# Patient Record
Sex: FEMALE | Race: WHITE | NOT HISPANIC OR LATINO | Employment: FULL TIME | URBAN - METROPOLITAN AREA
[De-identification: names, ages, dates, MRNs, and addresses within clinical notes are randomized per-mention and may not be internally consistent; named-entity substitution may affect disease eponyms.]

---

## 2019-07-18 ENCOUNTER — OCCUPATIONAL HEALTH (OUTPATIENT)
Dept: URGENT CARE | Facility: CLINIC | Age: 24
End: 2019-07-18

## 2019-07-18 DIAGNOSIS — Z02.1 DRUG SCREENING, PRE-EMPLOYMENT: Primary | ICD-10-CM

## 2019-07-18 LAB
CTP QC/QA: YES
POC 5 PANEL DRUG SCREEN: NEGATIVE

## 2019-07-18 PROCEDURE — 80305 POCT RAPID DRUG SCREEN 5 PANEL: ICD-10-PCS | Mod: QW,S$GLB,, | Performed by: FAMILY MEDICINE

## 2019-07-18 PROCEDURE — 80305 DRUG TEST PRSMV DIR OPT OBS: CPT | Mod: QW,S$GLB,, | Performed by: FAMILY MEDICINE

## 2019-12-29 ENCOUNTER — HOSPITAL ENCOUNTER (EMERGENCY)
Facility: HOSPITAL | Age: 24
Discharge: HOME | End: 2019-12-29
Attending: INTERNAL MEDICINE

## 2019-12-29 VITALS
HEART RATE: 88 BPM | WEIGHT: 195 LBS | RESPIRATION RATE: 20 BRPM | BODY MASS INDEX: 35.88 KG/M2 | OXYGEN SATURATION: 98 % | DIASTOLIC BLOOD PRESSURE: 62 MMHG | SYSTOLIC BLOOD PRESSURE: 131 MMHG | TEMPERATURE: 99 F | HEIGHT: 62 IN

## 2019-12-29 DIAGNOSIS — R09.81 NASAL CONGESTION: ICD-10-CM

## 2019-12-29 DIAGNOSIS — H65.93 BILATERAL OTITIS MEDIA WITH EFFUSION: Primary | ICD-10-CM

## 2019-12-29 LAB
B-HCG UR QL: NEGATIVE
CTP QC/QA: YES

## 2019-12-29 PROCEDURE — 99284 EMERGENCY DEPT VISIT MOD MDM: CPT | Mod: ER

## 2019-12-29 PROCEDURE — 81025 URINE PREGNANCY TEST: CPT | Mod: ER | Performed by: INTERNAL MEDICINE

## 2019-12-29 PROCEDURE — 25000003 PHARM REV CODE 250: Mod: ER | Performed by: PHYSICIAN ASSISTANT

## 2019-12-29 RX ORDER — LORATADINE 10 MG/1
10 TABLET ORAL DAILY
Qty: 7 TABLET | Refills: 0 | Status: SHIPPED | OUTPATIENT
Start: 2019-12-29 | End: 2020-01-05

## 2019-12-29 RX ORDER — IBUPROFEN 600 MG/1
600 TABLET ORAL
Status: COMPLETED | OUTPATIENT
Start: 2019-12-29 | End: 2019-12-29

## 2019-12-29 RX ORDER — OXYMETAZOLINE HCL 0.05 %
1 SPRAY, NON-AEROSOL (ML) NASAL 2 TIMES DAILY
Qty: 15 ML | Refills: 0 | Status: SHIPPED | OUTPATIENT
Start: 2019-12-29 | End: 2020-01-01

## 2019-12-29 RX ORDER — ACETAMINOPHEN 500 MG
1000 TABLET ORAL
Status: COMPLETED | OUTPATIENT
Start: 2019-12-29 | End: 2019-12-29

## 2019-12-29 RX ADMIN — ACETAMINOPHEN 1000 MG: 500 TABLET, FILM COATED ORAL at 08:12

## 2019-12-29 RX ADMIN — IBUPROFEN 600 MG: 600 TABLET ORAL at 08:12

## 2019-12-30 NOTE — ED PROVIDER NOTES
Encounter Date: 12/29/2019       History     Chief Complaint   Patient presents with    Otalgia     PT C/O BILATERAL EAR PAIN SINCE LAST NIGHT WITH PAIN INCREASING FOR 2 HOURS, ALSO REPORTS SINUS ISSUES FOR A FEW DAYS     24-year-old female with no pertinent past medical history presents to emergency department for 2 day history of atraumatic bilateral otalgia associated with nasal congestion and mild cough.  Notes multiple people around her with similar symptoms.  No history of similar symptoms.  Motrin taken earlier today with some improvement of symptoms. Denies fever, sore throat, emesis, and headache. Tolerating p.o..        Review of patient's allergies indicates:   Allergen Reactions    Penicillins Other (See Comments)     Unknown reaction     History reviewed. No pertinent past medical history.  Past Surgical History:   Procedure Laterality Date    MOUTH SURGERY       History reviewed. No pertinent family history.  Social History     Tobacco Use    Smoking status: Current Every Day Smoker     Packs/day: 0.25     Years: 3.00     Pack years: 0.75     Types: Cigarettes    Smokeless tobacco: Never Used   Substance Use Topics    Alcohol use: Yes     Comment: OCCASION    Drug use: Never     Review of Systems   Constitutional: Negative for fever.   HENT: Positive for congestion and ear pain. Negative for ear discharge, sore throat, trouble swallowing and voice change.    Respiratory: Positive for cough. Negative for shortness of breath and wheezing.    Cardiovascular: Negative for chest pain.   Gastrointestinal: Negative for abdominal pain, diarrhea, nausea and vomiting.   Genitourinary: Negative for dysuria, flank pain, frequency and urgency.   Musculoskeletal: Negative for back pain and neck pain.   Skin: Negative for rash.   Neurological: Negative for headaches.   All other systems reviewed and are negative.      Physical Exam     Initial Vitals [12/29/19 2007]   BP Pulse Resp Temp SpO2   131/62 88 20  98.5 °F (36.9 °C) 98 %      MAP       --         Physical Exam    Nursing note and vitals reviewed.  Constitutional: She appears well-developed and well-nourished. She is not diaphoretic. No distress.   HENT:   Head: Normocephalic and atraumatic.   Nose: Nose normal.   Bilateral non purulent effusions.  External ears and mastoids normal. No meningeal signs. No dental abscess or oropharynx abnormalities.  Nasal congestion present.  No active rhinorrhea. No sinus tenderness.   Eyes: Conjunctivae and EOM are normal. Right eye exhibits no discharge. Left eye exhibits no discharge.   Neck: Normal range of motion. No tracheal deviation present. No JVD present.   Cardiovascular: Normal rate, regular rhythm and normal heart sounds. Exam reveals no friction rub.    No murmur heard.  Pulmonary/Chest: Breath sounds normal. No stridor. No respiratory distress. She has no wheezes. She has no rhonchi. She has no rales. She exhibits no tenderness.   Musculoskeletal: She exhibits no edema or tenderness.   Neurological: She is alert and oriented to person, place, and time.   Skin: Skin is warm and dry. No rash and no abscess noted. No erythema. No pallor.         ED Course   Procedures  Labs Reviewed   POCT URINE PREGNANCY          Imaging Results    None          Medical Decision Making:   History:   Old Medical Records: I decided to obtain old medical records.  Clinical Tests:   Lab Tests: Ordered and Reviewed      This is an emergent evaluation of a 24 y.o. female presenting to the ED for otalgia associated with nasal congestion. Denies trauma, fever, drainage from ear, and hearing loss. Afebrile. Patient is non-toxic.    Presentation consistent with OME. No TM perforation. No signs of acute bacterial etiology from the ear at this time, including for AOM, bullous myringitis, OE, and mastoiditis. I doubt referred pain from dental etiology, bacterial rhinosinusitis, and deep space head/neck infection. No evidence to suggest herpes  zoster oticus or otomycosis. No signs of cholesteatoma. Less consistent with inner ear etiology, including for labyrinthitis and meniere's.      Discharged home with supportive care. Instructed to follow up with PCP and ENT for reevaluation and management of symptoms.     I discussed with the patient the diagnosis, treatment plan, indications for return to the emergency department, and for expected follow-up. The patient verbalized an understanding. The patient is asked if there are any questions or concerns. We discuss the case, until all issues are addressed to the patients satisfaction. Patient understands and is agreeable to the plan.                                Clinical Impression:       ICD-10-CM ICD-9-CM   1. Bilateral otitis media with effusion H65.93 381.4   2. Nasal congestion R09.81 478.19                             Toby Zazueta PA-C  12/29/19 2027